# Patient Record
Sex: MALE | Race: BLACK OR AFRICAN AMERICAN | NOT HISPANIC OR LATINO | Employment: FULL TIME | URBAN - METROPOLITAN AREA
[De-identification: names, ages, dates, MRNs, and addresses within clinical notes are randomized per-mention and may not be internally consistent; named-entity substitution may affect disease eponyms.]

---

## 2017-05-23 ENCOUNTER — OFFICE VISIT (OUTPATIENT)
Dept: INTERNAL MEDICINE | Facility: CLINIC | Age: 22
End: 2017-05-23
Payer: COMMERCIAL

## 2017-05-23 ENCOUNTER — LAB VISIT (OUTPATIENT)
Dept: LAB | Facility: HOSPITAL | Age: 22
End: 2017-05-23
Attending: INTERNAL MEDICINE
Payer: COMMERCIAL

## 2017-05-23 VITALS
WEIGHT: 181.69 LBS | DIASTOLIC BLOOD PRESSURE: 62 MMHG | HEART RATE: 68 BPM | RESPIRATION RATE: 14 BRPM | SYSTOLIC BLOOD PRESSURE: 112 MMHG | BODY MASS INDEX: 24.64 KG/M2 | TEMPERATURE: 98 F

## 2017-05-23 DIAGNOSIS — R36.9 PENILE DISCHARGE: ICD-10-CM

## 2017-05-23 DIAGNOSIS — Z00.00 ROUTINE GENERAL MEDICAL EXAMINATION AT A HEALTH CARE FACILITY: Primary | ICD-10-CM

## 2017-05-23 PROCEDURE — 99999 PR PBB SHADOW E&M-EST. PATIENT-LVL III: CPT | Mod: PBBFAC,,, | Performed by: INTERNAL MEDICINE

## 2017-05-23 PROCEDURE — 36415 COLL VENOUS BLD VENIPUNCTURE: CPT | Mod: PO

## 2017-05-23 PROCEDURE — 86592 SYPHILIS TEST NON-TREP QUAL: CPT | Mod: PO

## 2017-05-23 PROCEDURE — 86703 HIV-1/HIV-2 1 RESULT ANTBDY: CPT | Mod: PO

## 2017-05-23 PROCEDURE — 99395 PREV VISIT EST AGE 18-39: CPT | Mod: S$GLB,,, | Performed by: INTERNAL MEDICINE

## 2017-05-23 RX ORDER — AZITHROMYCIN 500 MG/1
TABLET, FILM COATED ORAL
Qty: 4 TABLET | Refills: 0 | Status: SHIPPED | OUTPATIENT
Start: 2017-05-23

## 2017-05-23 NOTE — PROGRESS NOTES
Subjective:       Patient ID: Frederick Mclain is a 21 y.o. male.    Chief Complaint: Annual Exam and Penile Discharge (x 3 days yellowish, burning with urination)    HPI  Pt here for wellness check, but c/o dysuria with penile d/c for 3 days 1 day s/p having unprotected sex.  No other complaints.  Review of Systems   All other systems reviewed and are negative.      Objective:      Physical Exam   Constitutional: He appears well-developed. No distress.   HENT:   Head: Normocephalic.   Eyes: EOM are normal. No scleral icterus.   Neck: Normal range of motion. No tracheal deviation present.   Cardiovascular: Normal rate, regular rhythm, normal heart sounds and intact distal pulses.    Pulmonary/Chest: Effort normal. No respiratory distress.   Abdominal: He exhibits no distension.   Genitourinary:   Genitourinary Comments: Warts on dorsal shaft of penis  Yellow d/c from urethral meatus   Musculoskeletal: Normal range of motion. He exhibits no edema.   Neurological: He is alert.   Skin: Skin is warm and dry. No rash noted. He is not diaphoretic. No erythema.   Psychiatric: He has a normal mood and affect. His behavior is normal.   Vitals reviewed.      Assessment:       1. Routine general medical examination at a health care facility    2. Penile discharge        Plan:       Frederick was seen today for annual exam and penile discharge.    Diagnoses and all orders for this visit:    Routine general medical examination at a health care facility    Penile discharge  -     HIV-1 and HIV-2 antibodies; Future  -     RPR; Future  -     azithromycin (ZITHROMAX) 500 MG tablet; 4 tabs po x 1 with food   Warned about unprotected sex    Return if symptoms worsen or fail to improve.

## 2017-05-24 LAB
HIV 1+2 AB+HIV1 P24 AG SERPL QL IA: NEGATIVE
RPR SER QL: NORMAL